# Patient Record
Sex: FEMALE | Race: OTHER
[De-identification: names, ages, dates, MRNs, and addresses within clinical notes are randomized per-mention and may not be internally consistent; named-entity substitution may affect disease eponyms.]

---

## 2017-11-27 ENCOUNTER — TRANSCRIPTION ENCOUNTER (OUTPATIENT)
Age: 37
End: 2017-11-27

## 2018-10-07 ENCOUNTER — TRANSCRIPTION ENCOUNTER (OUTPATIENT)
Age: 38
End: 2018-10-07

## 2018-11-12 ENCOUNTER — TRANSCRIPTION ENCOUNTER (OUTPATIENT)
Age: 38
End: 2018-11-12

## 2019-10-29 PROBLEM — Z12.4 CERVICAL CANCER SCREENING: Status: ACTIVE | Noted: 2019-10-29

## 2019-10-29 PROBLEM — Z12.39 BREAST CANCER SCREENING: Status: ACTIVE | Noted: 2019-10-29

## 2019-10-29 PROBLEM — R92.2 DENSE BREAST: Status: ACTIVE | Noted: 2019-10-29

## 2019-10-30 ENCOUNTER — APPOINTMENT (OUTPATIENT)
Dept: OBGYN | Facility: CLINIC | Age: 39
End: 2019-10-30
Payer: COMMERCIAL

## 2019-10-30 VITALS
DIASTOLIC BLOOD PRESSURE: 76 MMHG | SYSTOLIC BLOOD PRESSURE: 126 MMHG | BODY MASS INDEX: 23.22 KG/M2 | WEIGHT: 136 LBS | HEIGHT: 64 IN

## 2019-10-30 DIAGNOSIS — Z12.4 ENCOUNTER FOR SCREENING FOR MALIGNANT NEOPLASM OF CERVIX: ICD-10-CM

## 2019-10-30 DIAGNOSIS — Z12.39 ENCOUNTER FOR OTHER SCREENING FOR MALIGNANT NEOPLASM OF BREAST: ICD-10-CM

## 2019-10-30 DIAGNOSIS — Z78.9 OTHER SPECIFIED HEALTH STATUS: ICD-10-CM

## 2019-10-30 DIAGNOSIS — Z01.419 ENCOUNTER FOR GYNECOLOGICAL EXAMINATION (GENERAL) (ROUTINE) W/OUT ABNORMAL FINDINGS: ICD-10-CM

## 2019-10-30 DIAGNOSIS — R92.2 INCONCLUSIVE MAMMOGRAM: ICD-10-CM

## 2019-10-30 DIAGNOSIS — Z00.00 ENCOUNTER FOR GENERAL ADULT MEDICAL EXAMINATION W/OUT ABNORMAL FINDINGS: ICD-10-CM

## 2019-10-30 PROCEDURE — 99386 PREV VISIT NEW AGE 40-64: CPT

## 2019-10-30 RX ORDER — OMEPRAZOLE MAGNESIUM 10 MG/1
GRANULE, DELAYED RELEASE ORAL
Refills: 0 | Status: ACTIVE | COMMUNITY

## 2019-10-30 NOTE — DISCUSSION/SUMMARY
[FreeTextEntry1] : Annual gyn care\par Pap with HR HPV\par Mammogram up to date\par nml exam today\par \par Bleeding prolonged with spotting this cycle to 8 days - will watch -- if continues to become longer may require EMBx - (probably would need to be done in [place])\par \par Reviewed old records: small uterine fibroid 1.2 cm anterior (left of midline) on ultrasound 4/4/2019\par Labwork from PCP all normal - elevated K but normal on repeat\par \par \par Answered all questions\par \par At end of visit expressed to MA assisting me - that she didn't want a chaparone in the room during the Exam; Danitza explained that she is a MA assisting with patient care to facilitate exam and not only a "chaparone". It is our policy to have a MA during pelvic exams regardless of sex of the provider. \par \par Patient left prior to me being able to talk with her again\par \par Jess Mercer MD, PhD\par

## 2019-10-30 NOTE — REVIEW OF SYSTEMS
[Heartburn] : heartburn [Nl] : Hematologic/Lymphatic [Chills] : no chills [Feeling Tired] : not feeling tired [Chest Pain] : no chest pain [Palpitations] : no palpitations [Dyspnea] : no shortness of breath [Cough] : no cough [Abdominal Pain] : no abdominal pain [Constipation] : no constipation [Diarrhea] : no diarrhea [Dysuria] : no dysuria [Pelvic Pain] : no pelvic pain [Abn Vag Bleeding] : no abnormal vaginal bleeding [Urgency] : no urgency [Arthralgias] : no arthralgias [Joint Pain] : no joint pain [Joint Stiffness] : no joint stiffness [Skin Lesions] : no skin lesions [Breast Pain] : no breast pain [Breast Lump] : no breast lump [Dizziness] : no dizziness [Fainting] : no fainting [Headache] : no headache [Sleep Disturbances] : no sleep disturbances [Anxiety] : no anxiety [Depression] : no depression [Hot Flashes] : no hot flashes [Muscle Weakness] : no muscle weakness [Deepening Voice] : no deepening of the voice [Feeling Weak] : no feelings of weakness

## 2019-10-30 NOTE — PHYSICAL EXAM
[Awake] : awake [Alert] : alert [Acute Distress] : no acute distress [LAD] : no lymphadenopathy [Thyroid Nodule] : no thyroid nodule [Goiter] : no goiter [Mass] : no breast mass [Nipple Discharge] : no nipple discharge [Axillary LAD] : no axillary lymphadenopathy [Soft] : soft [Tender] : non tender [Distended] : not distended [H/Smegaly] : no hepatosplenomegaly [Oriented x3] : oriented to person, place, and time [Depressed Mood] : not depressed [Flat Affect] : affect not flat [No Lesions] : no genitalia lesions [Labia Majora Erythema] : no erythema of the labia majora [Labia Minora Erythema] : no erythema of the labia minora [Labia Majora] : labia major [Labia Minora] : labia minora [Normal] : clitoris [Pink Rugae] : pink rugae [Atrophy] : no atrophy [No Bleeding] : there was no active vaginal bleeding [Discharge] : had no discharge [Polyp ___ cm] : had no polyp [Pap Obtained] : a Pap smear was performed [Motion Tenderness] : there was no cervical motion tenderness [Normal Position] : in a normal position [Tenderness] : nontender [Enlarged ___ wks] : not enlarged [Mass ___ cm] : no uterine mass was palpated [Uterine Adnexae] : were not tender and not enlarged [Adnexa Tenderness] : were not tender [Ovarian Mass (___ Cm)] : there were no adnexal masses [Normal Rate] : normal [Rhythm Regular] : regular [Normal S1] : normal S1 [Normal S2] : normal S2 [CTAB] : CTAB [FreeTextEntry4] : patient requested a pediatric speculum [FreeTextEntry9] : deferred

## 2019-10-31 LAB — HPV HIGH+LOW RISK DNA PNL CVX: NOT DETECTED

## 2019-11-05 LAB — CYTOLOGY CVX/VAG DOC THIN PREP: NORMAL

## 2019-11-11 ENCOUNTER — TRANSCRIPTION ENCOUNTER (OUTPATIENT)
Age: 39
End: 2019-11-11

## 2020-04-26 ENCOUNTER — MESSAGE (OUTPATIENT)
Age: 40
End: 2020-04-26

## 2020-10-16 ENCOUNTER — TRANSCRIPTION ENCOUNTER (OUTPATIENT)
Age: 40
End: 2020-10-16

## 2020-12-21 PROBLEM — Z01.419 ENCOUNTER FOR ANNUAL ROUTINE GYNECOLOGICAL EXAMINATION: Status: RESOLVED | Noted: 2019-10-29 | Resolved: 2020-12-21

## 2023-10-10 ENCOUNTER — NON-APPOINTMENT (OUTPATIENT)
Age: 43
End: 2023-10-10

## 2024-11-07 ENCOUNTER — NON-APPOINTMENT (OUTPATIENT)
Age: 44
End: 2024-11-07

## 2024-12-02 ENCOUNTER — APPOINTMENT (OUTPATIENT)
Dept: GASTROENTEROLOGY | Facility: CLINIC | Age: 44
End: 2024-12-02